# Patient Record
Sex: FEMALE | Race: WHITE | NOT HISPANIC OR LATINO | Employment: FULL TIME | URBAN - METROPOLITAN AREA
[De-identification: names, ages, dates, MRNs, and addresses within clinical notes are randomized per-mention and may not be internally consistent; named-entity substitution may affect disease eponyms.]

---

## 2022-08-18 ENCOUNTER — OFFICE VISIT (OUTPATIENT)
Dept: URGENT CARE | Facility: URGENT CARE | Age: 37
End: 2022-08-18
Payer: COMMERCIAL

## 2022-08-18 VITALS
RESPIRATION RATE: 16 BRPM | OXYGEN SATURATION: 96 % | SYSTOLIC BLOOD PRESSURE: 105 MMHG | DIASTOLIC BLOOD PRESSURE: 70 MMHG | HEART RATE: 85 BPM | TEMPERATURE: 99.9 F

## 2022-08-18 DIAGNOSIS — J02.9 ACUTE VIRAL PHARYNGITIS: Primary | ICD-10-CM

## 2022-08-18 LAB
DEPRECATED S PYO AG THROAT QL EIA: NEGATIVE
GROUP A STREP BY PCR: NOT DETECTED

## 2022-08-18 PROCEDURE — 99203 OFFICE O/P NEW LOW 30 MIN: CPT | Performed by: PHYSICIAN ASSISTANT

## 2022-08-18 PROCEDURE — 87651 STREP A DNA AMP PROBE: CPT | Performed by: PHYSICIAN ASSISTANT

## 2022-08-18 ASSESSMENT — PAIN SCALES - GENERAL: PAINLEVEL: EXTREME PAIN (8)

## 2022-08-18 ASSESSMENT — ENCOUNTER SYMPTOMS
SORE THROAT: 1
CONSTITUTIONAL NEGATIVE: 1
NAUSEA: 1
RESPIRATORY NEGATIVE: 1

## 2022-08-18 NOTE — PROGRESS NOTES
Assessment & Plan     Acute viral pharyngitis  - Streptococcus A Rapid Screen w/Reflex to PCR - Clinic Collect  - Group A Streptococcus PCR Throat Swab     Strep (-)  Increase fluids with water, Pedialyte, Gatorade, or rehydrating beverages. Alternate Tylenol and Ibuprofen as needed for aches, pains or fever. If needed, follow soft food diet. Rest as much as possible. Use OTC cough and cold medication. Run humidifier at night. Gargle with hot salty water. Have warm tea or water with honey. Follow up in clinic if symptoms persist or worsen. This usually can last 7-10 days.     Return if symptoms worsen or fail to improve, for Follow up.    Subjective     Willa is a 36 year old female who presents to clinic today for the following health issues:  Chief Complaint   Patient presents with     Urgent Care     Throat Pain     Per pt states she has been having throat pain for a couple of weeks states her two little ones have been sick on and off. States two co-workers tested positive for strep, states no fever but deep congestion , sore throat , and nausea. Pt has tried throat spray, ibuprofen and gargling      Willa presents with reports of sore throat x 2 weeks. She reports her children are sick and at home, they have gotten better. She reports congestion, sore throat, nausea. She has tried throat spray, ibuprofen and gargling. She had a cold, got better, then got worse again. Some days she starts to feel better.           Review of Systems   Constitutional: Negative.    HENT: Positive for congestion, postnasal drip and sore throat.    Respiratory: Negative.    Gastrointestinal: Positive for nausea.           Objective    /70   Pulse 85   Temp 99.9  F (37.7  C) (Skin)   Resp 16   LMP 08/15/2022   SpO2 96%   Physical Exam  Constitutional:       Appearance: Normal appearance.   HENT:      Head: Normocephalic and atraumatic.      Right Ear: Tympanic membrane, ear canal and external ear normal.      Left Ear:  Tympanic membrane, ear canal and external ear normal.      Nose: Congestion present.      Mouth/Throat:      Mouth: Mucous membranes are moist.      Pharynx: Oropharynx is clear. No oropharyngeal exudate or posterior oropharyngeal erythema.   Eyes:      Extraocular Movements: Extraocular movements intact.      Conjunctiva/sclera: Conjunctivae normal.      Pupils: Pupils are equal, round, and reactive to light.   Cardiovascular:      Rate and Rhythm: Normal rate and regular rhythm.      Heart sounds: Normal heart sounds.   Pulmonary:      Effort: Pulmonary effort is normal.      Breath sounds: Normal breath sounds.   Musculoskeletal:      Cervical back: Normal range of motion and neck supple.   Skin:     General: Skin is warm and dry.   Neurological:      Mental Status: She is alert.              Claude Zuniga PA-C

## 2022-08-18 NOTE — PATIENT INSTRUCTIONS
Strep (-)  Increase fluids with water, Pedialyte, Gatorade, or rehydrating beverages. Alternate Tylenol and Ibuprofen as needed for aches, pains or fever. If needed, follow soft food diet. Rest as much as possible. Use OTC cough and cold medication. Run humidifier at night. Gargle with hot salty water. Have warm tea or water with honey. Follow up in clinic if symptoms persist or worsen. This usually can last 7-10 days.

## 2022-12-19 ASSESSMENT — ENCOUNTER SYMPTOMS
HEARTBURN: 0
NERVOUS/ANXIOUS: 0
PARESTHESIAS: 0
COUGH: 1
PALPITATIONS: 0
FREQUENCY: 0
DIZZINESS: 0
SORE THROAT: 1
BREAST MASS: 0
HEADACHES: 1
FEVER: 0
HEMATOCHEZIA: 0
WEAKNESS: 0
DIARRHEA: 0
SHORTNESS OF BREATH: 0
NAUSEA: 0
JOINT SWELLING: 0
DYSURIA: 0
CONSTIPATION: 0
HEMATURIA: 0
ABDOMINAL PAIN: 1
EYE PAIN: 0
ARTHRALGIAS: 0
MYALGIAS: 0
CHILLS: 0

## 2022-12-19 NOTE — PATIENT INSTRUCTIONS
Good to see you today!  Schedule visit with me for Pap  PT referral placed.    Preventive Health Recommendations  Female Ages 26 - 39  Yearly exam:   See your health care provider every year in order to  Review health changes.   Discuss preventive care.    Review your medicines if you your doctor has prescribed any.    Until age 30: Get a Pap test every three years (more often if you have had an abnormal result).    After age 30: Talk to your doctor about whether you should have a Pap test every 3 years or have a Pap test with HPV screening every 5 years.   You do not need a Pap test if your uterus was removed (hysterectomy) and you have not had cancer.  You should be tested each year for STDs (sexually transmitted diseases), if you're at risk.   Talk to your provider about how often to have your cholesterol checked.  If you are at risk for diabetes, you should have a diabetes test (fasting glucose).  Shots: Get a flu shot each year. Get a tetanus shot every 10 years.   Nutrition:   Eat at least 5 servings of fruits and vegetables each day.  Eat whole-grain bread, whole-wheat pasta and brown rice instead of white grains and rice.  Get adequate Calcium and Vitamin D.     Lifestyle  Exercise at least 150 minutes a week (30 minutes a day, 5 days of the week). This will help you control your weight and prevent disease.  Limit alcohol to one drink per day.  No smoking.   Wear sunscreen to prevent skin cancer.  See your dentist every six months for an exam and cleaning.

## 2022-12-20 ENCOUNTER — OFFICE VISIT (OUTPATIENT)
Dept: FAMILY MEDICINE | Facility: CLINIC | Age: 37
End: 2022-12-20
Payer: COMMERCIAL

## 2022-12-20 VITALS
HEIGHT: 67 IN | TEMPERATURE: 97.3 F | WEIGHT: 112 LBS | SYSTOLIC BLOOD PRESSURE: 119 MMHG | DIASTOLIC BLOOD PRESSURE: 78 MMHG | BODY MASS INDEX: 17.58 KG/M2 | OXYGEN SATURATION: 98 % | HEART RATE: 91 BPM

## 2022-12-20 DIAGNOSIS — Z00.00 ROUTINE GENERAL MEDICAL EXAMINATION AT A HEALTH CARE FACILITY: Primary | ICD-10-CM

## 2022-12-20 DIAGNOSIS — N39.3 FEMALE STRESS INCONTINENCE: ICD-10-CM

## 2022-12-20 DIAGNOSIS — M62.08 DIASTASIS RECTI: ICD-10-CM

## 2022-12-20 DIAGNOSIS — I47.10 SVT (SUPRAVENTRICULAR TACHYCARDIA) (H): ICD-10-CM

## 2022-12-20 DIAGNOSIS — Z97.5 IUD (INTRAUTERINE DEVICE) IN PLACE: ICD-10-CM

## 2022-12-20 PROBLEM — I73.00 RAYNAUD'S PHENOMENON: Status: ACTIVE | Noted: 2021-01-28

## 2022-12-20 PROBLEM — K21.9 GERD (GASTROESOPHAGEAL REFLUX DISEASE): Status: ACTIVE | Noted: 2017-09-28

## 2022-12-20 NOTE — NURSING NOTE
"37 year old  Chief Complaint   Patient presents with     Physical     Pt reports she is on Day 14 of being sick, has a really bad cough. Incontinence and pelvic pain.        Blood pressure 119/78, pulse 91, temperature 97.3  F (36.3  C), height 1.71 m (5' 7.32\"), weight 50.8 kg (112 lb), SpO2 98 %, currently breastfeeding. Body mass index is 17.37 kg/m .  There is no problem list on file for this patient.      Wt Readings from Last 2 Encounters:   12/20/22 50.8 kg (112 lb)     BP Readings from Last 3 Encounters:   12/20/22 119/78   08/18/22 105/70         Current Outpatient Medications   Medication     levonorgestrel (KYLEENA) 19.5 MG IUD     Prenatal Vit-Fe Fumarate-FA (PRENATAL VITAMIN PO)     VITAMIN D PO     No current facility-administered medications for this visit.       Social History     Tobacco Use     Smoking status: Never     Smokeless tobacco: Never   Substance Use Topics     Alcohol use: Yes     Comment: 2 drinks week     Drug use: Never       Health Maintenance Due   Topic Date Due     ADVANCE CARE PLANNING  Never done     PAP  Never done       No results found for: PAP      December 20, 2022 8:46 AM  "

## 2022-12-20 NOTE — PROGRESS NOTES
CC: Physical (Pt reports she is on Day 14 of being sick, has a really bad cough. Incontinence and pelvic pain. )      Lizeth is a 37 year old female who presents to clinic for an annual exam.       New concerns:  Sick x 14 days. Has a lingering cough but otherwise slowly starting to feel better. 3-5 days of fever, has not had recurrent fever since that time.    Has been having increase incontinence with coughing  Small amounts of leaking  Did pelvic floor PT, reached maximum number of visits  [facility] PT  [place] it was helpful for this and distasis recti     Was having pain with intercourse, worse on R side about 2 weeks ago  Felt similar to ovarian cyst pain in the past  Has since resolved and not been an ongoing issue    History of SVT -   Gets more dizziness with illness  Pushes fluids, caution with getting up   No other SVT symptoms outside of illness    Chronic health conditions:  Patient Active Problem List   Diagnosis     Basal cell carcinoma of scalp and skin of neck     GERD (gastroesophageal reflux disease)     Orthostatic dizziness     Palpitations     Raynaud's phenomenon     SVT (supraventricular tachycardia) (H)     IUD (intrauterine device) in place       We reviewed and updated her medication list. Her past medical and surgical history as well as her family history were reviewed and updated as necessary.    Gynecological history:  Menstrual/PMS/menopausal symptoms: very light with IUD  Last Pap:  2017, result Normal  History of abnormal Paps: no  Concern for STIs: no  Safety: Feels safe in relationship  Would you like to become pregnant in the next year? maybe Contraceptive method: Kyleena IUD  OB history:   Breast cancer screening: n/a    Other health-related habits:  Nutrition: varied and balanced  Physical activity: 2-3 days/week  Tobacco: None    Alcohol: Occassional  Drug use: no   Mood: normal  Dental: Up to date  Vaccines: UTD  Hep C & HIV screening: UTD  DM  "screening: negative OGT in pregnancy 1 year ago  Lipids: will defer screening until weaned from breastfeeding  Colon cancer screening: n/a      OBJECTIVE:   /78   Pulse 91   Temp 97.3  F (36.3  C)   Ht 1.71 m (5' 7.32\")   Wt 50.8 kg (112 lb)   SpO2 98%   Breastfeeding Yes   BMI 17.37 kg/m     General: Healthy female in NAD.  Cooperative and pleasant.  HEENT: Normocephalic, atraumatic. Oropharynx moist without lesions or exudate.  TMs normal. Supple neck, without lymphadenopathy or thyromegaly.    Lungs: CTA bilaterally.  CV: RRR, normal S1 and S2, without murmurs, rubs, or gallops appreciated.    Abdomen: Soft, NT, ND.  No masses or hepatosplenomegaly appreciated.    Gyn: deferred today, will return at later date  Extremities: WWW, without deformity, edema.  Skin: Clear without lesions or rashes.   Neuro: Grossly intact, nonfocal.  Psych: Mood and affect appropriate.      ASSESSMENT AND PLAN:   Willa was seen today for physical.    Diagnoses and all orders for this visit:    Routine general medical examination at a health care facility    IUD (intrauterine device) in place    SVT (supraventricular tachycardia) (H)    Female stress incontinence  -     Physical Therapy Referral; Future    Diastasis recti  -     Physical Therapy Referral; Future      Will follow-up for Pap  No evidence of PNA on exam today, discussed post-viral cough  Return to PT if symptoms do not resolve following URI    Follow-up: 1 year, sooner KELSEA Maguire MD/[facility]  Family Medicine   "

## 2022-12-22 ENCOUNTER — OFFICE VISIT (OUTPATIENT)
Dept: FAMILY MEDICINE | Facility: CLINIC | Age: 37
End: 2022-12-22
Payer: COMMERCIAL

## 2022-12-22 VITALS
HEART RATE: 84 BPM | SYSTOLIC BLOOD PRESSURE: 114 MMHG | OXYGEN SATURATION: 100 % | WEIGHT: 112 LBS | TEMPERATURE: 97.5 F | DIASTOLIC BLOOD PRESSURE: 72 MMHG | HEIGHT: 67 IN | BODY MASS INDEX: 17.58 KG/M2

## 2022-12-22 DIAGNOSIS — Z12.4 SCREENING FOR CERVICAL CANCER: Primary | ICD-10-CM

## 2022-12-22 PROCEDURE — 87624 HPV HI-RISK TYP POOLED RSLT: CPT | Performed by: FAMILY MEDICINE

## 2022-12-22 PROCEDURE — G0145 SCR C/V CYTO,THINLAYER,RESCR: HCPCS | Performed by: FAMILY MEDICINE

## 2022-12-22 NOTE — PROGRESS NOTES
"CC: Gyn Exam (Pap and breast exam today)        ASSESSMENT/PLAN:   Willa was seen today for gyn exam.    Diagnoses and all orders for this visit:    Screening for cervical cancer  -     Pap imaged thin layer screen with HPV - recommended age 30 - 65 years; Future        Worrisome signs and symptoms were discussed with Willa and she was instructed to return to the clinic for concerning symptoms or to call with questions. All patient questions answered.    Follow up: 1 year, sooner KELSEA Maguire MD/[facility]  Family Medicine      SUBJECTIVE: Lizeth is a 37 year old female who comes in with the following concerns:    Breast and pelvic exam      OBJECTIVE:   /72   Pulse 84   Temp 97.5  F (36.4  C)   Ht 1.71 m (5' 7.32\")   Wt 50.8 kg (112 lb)   LMP 12/15/2022 (Approximate)   SpO2 100%   BMI 17.37 kg/m    General: Alert and oriented in no acute distress.  Skin: Clear without lesions or rashes.   Lymph: No anterior cervical lymphadenopathy.   Eyes: PERRL. EOMI.   ENT: TMs intact and pearly gray. Oropharynx moist without lesions or exudate. Supple neck.  Neuro: Grossly intact, nonfocal.  Breast exam: no axillary LAD. No palpable masses or skin changes present  Cardio: RRR, normal S1 and S2, without murmurs, rubs, or gallops appreciated.    Resp: CTA bilaterally. Normal respiratory effort.   GI: Soft, NT, ND.  No masses or hepatosplenomegaly appreciated. 1-2 finger DR present with possible small ventral hernia 2cm caudal to umbilicus  : normal external genitalia. Normal vaginal mucosa, moderate white discharge. Multiparous cervix without lesions present, IUD strings visualized extending 2cm from cervical os. Pap collected.  MSK: Distal pulses 2+ and symmetric, extremities without deformity, edema.  Psych: Mood and affect appropriate.     "

## 2022-12-26 LAB
BKR LAB AP GYN ADEQUACY: NORMAL
BKR LAB AP GYN INTERPRETATION: NORMAL
BKR LAB AP HPV REFLEX: NORMAL
BKR LAB AP LMP: NORMAL
BKR LAB AP PREVIOUS ABNORMAL: NORMAL
PATH REPORT.COMMENTS IMP SPEC: NORMAL
PATH REPORT.COMMENTS IMP SPEC: NORMAL
PATH REPORT.RELEVANT HX SPEC: NORMAL

## 2022-12-28 LAB
HUMAN PAPILLOMA VIRUS 16 DNA: NEGATIVE
HUMAN PAPILLOMA VIRUS 18 DNA: NEGATIVE
HUMAN PAPILLOMA VIRUS FINAL DIAGNOSIS: NORMAL
HUMAN PAPILLOMA VIRUS OTHER HR: NEGATIVE

## 2023-01-17 ENCOUNTER — OFFICE VISIT (OUTPATIENT)
Dept: FAMILY MEDICINE | Facility: CLINIC | Age: 38
End: 2023-01-17
Payer: COMMERCIAL

## 2023-01-17 VITALS
WEIGHT: 110 LBS | RESPIRATION RATE: 16 BRPM | HEIGHT: 66 IN | DIASTOLIC BLOOD PRESSURE: 68 MMHG | BODY MASS INDEX: 17.68 KG/M2 | HEART RATE: 85 BPM | SYSTOLIC BLOOD PRESSURE: 102 MMHG | OXYGEN SATURATION: 99 % | TEMPERATURE: 98 F

## 2023-01-17 DIAGNOSIS — J06.9 UPPER RESPIRATORY TRACT INFECTION, UNSPECIFIED TYPE: Primary | ICD-10-CM

## 2023-01-17 DIAGNOSIS — J02.0 STREP THROAT: ICD-10-CM

## 2023-01-17 LAB
DEPRECATED S PYO AG THROAT QL EIA: POSITIVE
FLUAV RNA SPEC QL NAA+PROBE: NEGATIVE
FLUBV RNA RESP QL NAA+PROBE: NEGATIVE
RSV RNA SPEC NAA+PROBE: NEGATIVE
SARS-COV-2 RNA RESP QL NAA+PROBE: NEGATIVE

## 2023-01-17 PROCEDURE — 87637 SARSCOV2&INF A&B&RSV AMP PRB: CPT | Performed by: FAMILY MEDICINE

## 2023-01-17 RX ORDER — AMOXICILLIN 875 MG
875 TABLET ORAL 2 TIMES DAILY
Qty: 14 TABLET | Refills: 0 | Status: SHIPPED | OUTPATIENT
Start: 2023-01-17 | End: 2023-10-31

## 2023-01-17 NOTE — NURSING NOTE
"37 year old  Chief Complaint   Patient presents with     Pharyngitis     Sore throat ongoing 2-3 days, body aches. Experienced chills yesterday, not today.       Blood pressure 102/68, pulse 85, temperature 98  F (36.7  C), temperature source Skin, resp. rate 16, height 1.676 m (5' 6\"), weight 49.9 kg (110 lb), last menstrual period 12/15/2022, SpO2 99 %, currently breastfeeding. Body mass index is 17.75 kg/m .  Patient Active Problem List   Diagnosis     Basal cell carcinoma of scalp and skin of neck     GERD (gastroesophageal reflux disease)     Orthostatic dizziness     Palpitations     Raynaud's phenomenon     SVT (supraventricular tachycardia) (H)     IUD (intrauterine device) in place       Wt Readings from Last 2 Encounters:   01/17/23 49.9 kg (110 lb)   12/22/22 50.8 kg (112 lb)     BP Readings from Last 3 Encounters:   01/17/23 102/68   12/22/22 114/72   12/20/22 119/78         Current Outpatient Medications   Medication     levonorgestrel (KYLEENA) 19.5 MG IUD     Prenatal Vit-Fe Fumarate-FA (PRENATAL VITAMIN PO)     VITAMIN D PO     No current facility-administered medications for this visit.       Social History     Tobacco Use     Smoking status: Never     Smokeless tobacco: Never   Vaping Use     Vaping Use: Never used   Substance Use Topics     Alcohol use: Yes     Comment: 2 drinks week     Drug use: Never       Health Maintenance Due   Topic Date Due     ADVANCE CARE PLANNING  Never done     Pneumococcal Vaccine: Pediatrics (0 to 5 Years) and At-Risk Patients (6 to 64 Years) (1 - PCV) Never done       No results found for: PAP      January 17, 2023 9:49 AM    "

## 2023-01-17 NOTE — PROGRESS NOTES
"  Assessment & Plan   Problem List Items Addressed This Visit    None  Visit Diagnoses     Upper respiratory tract infection, unspecified type    -  Primary    Relevant Orders    Symptomatic Influenza A/B & SARS-CoV2 (COVID-19) Virus PCR Multiplex    Streptococcus A Rapid Screen w/Reflex to PCR - Clinic Collect    Strep throat        Relevant Medications    amoxicillin (AMOXIL) 875 MG tablet         Rapid strep positive. Notified patient of results and ABX sent as noted above.     Will follow up further labs as indicated.     22 minutes spent on the date of the encounter doing chart review, history and exam, documentation and further activities as noted.    Sang Jimenez MD  [facility]    Lenny Stanley is a 37 year old presenting for the following health issues:  No chief complaint on file.      HPI   Sore throat, swollen glands  - general body aches and fatigue  - negative COVID test  - kids in  bring everything home  - recently the family has gone through RSF, flu and COVID, so far not strep    Review of Systems   Constitutional, HEENT, cardiovascular, pulmonary, gi and gu systems are negative, except as otherwise noted.      Objective    /68 (BP Location: Right arm, Patient Position: Sitting, Cuff Size: Adult Regular)   Pulse 85   Temp 98  F (36.7  C) (Skin)   Resp 16   Ht 1.676 m (5' 6\")   Wt 49.9 kg (110 lb)   LMP 12/15/2022 (Approximate)   SpO2 99%   Breastfeeding Yes   BMI 17.75 kg/m    Body mass index is 17.75 kg/m .  Physical Exam   GENERAL: healthy, alert and no distress  HENT: posterior oropharyngeal exudate and erythema; ear canals and TM's normal, nose and mouth without ulcers or lesions  NECK: bilateral shoddy lymphadenopathy, no asymmetry, masses, or scars and thyroid normal to palpation  RESP: lungs clear to auscultation - no rales, rhonchi or wheezes  CV: regular rate and rhythm, normal S1 S2, no S3 or S4, no murmur, click or rub, no peripheral edema and peripheral " pulses strong  MS: no gross musculoskeletal defects noted, no edema

## 2023-04-20 ENCOUNTER — OFFICE VISIT (OUTPATIENT)
Dept: FAMILY MEDICINE | Facility: CLINIC | Age: 38
End: 2023-04-20
Payer: COMMERCIAL

## 2023-04-20 VITALS
DIASTOLIC BLOOD PRESSURE: 74 MMHG | WEIGHT: 110 LBS | BODY MASS INDEX: 17.68 KG/M2 | RESPIRATION RATE: 15 BRPM | HEART RATE: 81 BPM | OXYGEN SATURATION: 99 % | HEIGHT: 66 IN | TEMPERATURE: 97.6 F | SYSTOLIC BLOOD PRESSURE: 118 MMHG

## 2023-04-20 DIAGNOSIS — J02.0 STREP THROAT: ICD-10-CM

## 2023-04-20 DIAGNOSIS — J02.9 SORE THROAT: Primary | ICD-10-CM

## 2023-04-20 PROBLEM — S92.109A TALAR FRACTURE: Status: ACTIVE | Noted: 2017-09-28

## 2023-04-20 LAB — DEPRECATED S PYO AG THROAT QL EIA: POSITIVE

## 2023-04-20 RX ORDER — CLINDAMYCIN HCL 300 MG
300 CAPSULE ORAL 3 TIMES DAILY
Qty: 30 CAPSULE | Refills: 0 | Status: SHIPPED | OUTPATIENT
Start: 2023-04-20 | End: 2023-10-31

## 2023-04-20 NOTE — NURSING NOTE
"37 year old  Chief Complaint   Patient presents with     Pharyngitis     Ongoing since Monday, pharyngitis, congestion in the throat, fatigue.        Blood pressure 118/74, pulse 81, temperature 97.6  F (36.4  C), temperature source Skin, resp. rate 15, height 1.676 m (5' 6\"), weight 49.9 kg (110 lb), SpO2 99 %, currently breastfeeding. Body mass index is 17.75 kg/m .  Patient Active Problem List   Diagnosis     Basal cell carcinoma of scalp and skin of neck     GERD (gastroesophageal reflux disease)     Orthostatic dizziness     Palpitations     Raynaud's phenomenon     SVT (supraventricular tachycardia) (H)     IUD (intrauterine device) in place       Wt Readings from Last 2 Encounters:   04/20/23 49.9 kg (110 lb)   01/17/23 49.9 kg (110 lb)     BP Readings from Last 3 Encounters:   04/20/23 118/74   01/17/23 102/68   12/22/22 114/72         Current Outpatient Medications   Medication     levonorgestrel (KYLEENA) 19.5 MG IUD     Prenatal Vit-Fe Fumarate-FA (PRENATAL VITAMIN PO)     VITAMIN D PO     amoxicillin (AMOXIL) 875 MG tablet     No current facility-administered medications for this visit.       Social History     Tobacco Use     Smoking status: Never     Smokeless tobacco: Never   Vaping Use     Vaping status: Never Used   Substance Use Topics     Alcohol use: Yes     Comment: 2 drinks week     Drug use: Never       Health Maintenance Due   Topic Date Due     ADVANCE CARE PLANNING  Never done     Pneumococcal Vaccine: Pediatrics (0 to 5 Years) and At-Risk Patients (6 to 64 Years) (1 - PCV) Never done       No results found for: PAP      April 20, 2023 10:47 AM    "

## 2023-04-20 NOTE — PROGRESS NOTES
"  Assessment & Plan   Problem List Items Addressed This Visit    None  Visit Diagnoses     Sore throat    -  Primary    Relevant Orders    Streptococcus A Rapid Screen w/Reflex to PCR (Completed)    Strep throat        Relevant Medications    clindamycin (CLEOCIN) 300 MG capsule         Positive strep test as noted below. Will treat with Clindamycin given listed allergy to amoxicillin. Advised patient that diarrhea and upset stomach can be seen with ABX use and does not necessarily indicate an allergic response.     25 minutes spent on the date of the encounter doing chart review, history and exam, documentation and further activities as noted.    Sang Jimenez MD  [facility]    Subjective   Lizeth is a 37 year old, presenting for the following health issues:  Pharyngitis (Ongoing since Monday, pharyngitis, congestion in the throat, fatigue. )    HPI   Sore Throat  - for the past 4 days  - daughter was sick just prior, but her only real symptom was fatigue  - family has been through \"all the usual\" respiratory infections this season  - Lizeth denies significant head congestion, drainage, cough, headache  - but the sore throat has been unbearable over the past few days  - listed allergy to amoxicillin with listed side effect of diarrhea      Review of Systems   Constitutional, HEENT, cardiovascular, pulmonary, gi and gu systems are negative, except as otherwise noted.      Objective    /74 (BP Location: Right arm, Patient Position: Sitting, Cuff Size: Adult Regular)   Pulse 81   Temp 97.6  F (36.4  C) (Skin)   Resp 15   Ht 1.676 m (5' 6\")   Wt 49.9 kg (110 lb)   SpO2 99%   BMI 17.75 kg/m    Body mass index is 17.75 kg/m .  Physical Exam   GENERAL: healthy, alert and no distress  THROAT: red, swollen tonsils with visible exudate  NECK: bilateral lymphadenopathy, no asymmetry, masses, or scars and thyroid normal to palpation  RESP: lungs clear to auscultation - no rales, rhonchi or wheezes  CV: " regular rate and rhythm, normal S1 S2, no S3 or S4, no murmur, click or rub, no peripheral edema and peripheral pulses strong  ABDOMEN: soft, nontender, no hepatosplenomegaly, no masses and bowel sounds normal  MS: no gross musculoskeletal defects noted, no edema    Recent Results (from the past 24 hour(s))   Streptococcus A Rapid Screen w/Reflex to PCR    Collection Time: 04/20/23 11:02 AM    Specimen: Throat; Swab   Result Value Ref Range    Group A Strep antigen Positive (A) Negative

## 2023-10-30 ASSESSMENT — ENCOUNTER SYMPTOMS
HEMATOCHEZIA: 0
HEARTBURN: 0
CHILLS: 0
NERVOUS/ANXIOUS: 0
MYALGIAS: 0
JOINT SWELLING: 0
NAUSEA: 0
EYE PAIN: 1
PARESTHESIAS: 0
DYSURIA: 0
DIZZINESS: 0
HEMATURIA: 0
FEVER: 0
SORE THROAT: 0
CONSTIPATION: 0
COUGH: 0
SHORTNESS OF BREATH: 0
FREQUENCY: 0
HEADACHES: 0
DIARRHEA: 0
WEAKNESS: 0
BREAST MASS: 0
ABDOMINAL PAIN: 0
PALPITATIONS: 0
ARTHRALGIAS: 0

## 2023-10-31 ENCOUNTER — OFFICE VISIT (OUTPATIENT)
Dept: FAMILY MEDICINE | Facility: CLINIC | Age: 38
End: 2023-10-31
Payer: COMMERCIAL

## 2023-10-31 VITALS
DIASTOLIC BLOOD PRESSURE: 82 MMHG | HEART RATE: 91 BPM | TEMPERATURE: 98.5 F | HEIGHT: 66 IN | SYSTOLIC BLOOD PRESSURE: 116 MMHG | WEIGHT: 111 LBS | OXYGEN SATURATION: 99 % | BODY MASS INDEX: 17.84 KG/M2

## 2023-10-31 DIAGNOSIS — Z00.00 ROUTINE GENERAL MEDICAL EXAMINATION AT A HEALTH CARE FACILITY: Primary | ICD-10-CM

## 2023-10-31 DIAGNOSIS — K42.9 UMBILICAL HERNIA WITHOUT OBSTRUCTION AND WITHOUT GANGRENE: ICD-10-CM

## 2023-10-31 DIAGNOSIS — Z85.828 HISTORY OF BASAL CELL CARCINOMA: ICD-10-CM

## 2023-10-31 DIAGNOSIS — I47.10 SVT (SUPRAVENTRICULAR TACHYCARDIA) (H): ICD-10-CM

## 2023-10-31 DIAGNOSIS — Z23 NEED FOR VACCINATION: ICD-10-CM

## 2023-10-31 LAB
ERYTHROCYTE [DISTWIDTH] IN BLOOD BY AUTOMATED COUNT: 12.8 % (ref 10–15)
FERRITIN SERPL-MCNC: 142 NG/ML (ref 6–175)
HCT VFR BLD AUTO: 39.2 % (ref 35–47)
HGB BLD-MCNC: 12.7 G/DL (ref 11.7–15.7)
MCH RBC QN AUTO: 30.1 PG (ref 26.5–33)
MCHC RBC AUTO-ENTMCNC: 32.4 G/DL (ref 31.5–36.5)
MCV RBC AUTO: 93 FL (ref 78–100)
PLATELET # BLD AUTO: 199 10E3/UL (ref 150–450)
RBC # BLD AUTO: 4.22 10E6/UL (ref 3.8–5.2)
TSH SERPL DL<=0.005 MIU/L-ACNC: 1.12 UIU/ML (ref 0.3–4.2)
WBC # BLD AUTO: 4.6 10E3/UL (ref 4–11)

## 2023-10-31 PROCEDURE — 84443 ASSAY THYROID STIM HORMONE: CPT | Mod: ORL | Performed by: FAMILY MEDICINE

## 2023-10-31 PROCEDURE — 82728 ASSAY OF FERRITIN: CPT | Mod: ORL | Performed by: FAMILY MEDICINE

## 2023-10-31 RX ORDER — METOPROLOL TARTRATE 50 MG
25-50 TABLET ORAL 2 TIMES DAILY PRN
Qty: 30 TABLET | Refills: 1 | Status: SHIPPED | OUTPATIENT
Start: 2023-10-31

## 2023-10-31 NOTE — NURSING NOTE
"Injectable Influenza Immunization Documentation    1.  Has the patient received the information for the injectable influenza vaccine? YES     2. Is the patient 6 months of age or older? YES     3. Does the patient have any of the following contraindications?         Severe allergy to eggs? No     Severe allergic reaction to previous influenza vaccines? No   Severe allergy to latex? No       History of Guillain-Embarrass syndrome? No     Currently have a temperature greater than 100.4F? No        4.  Severely egg allergic patients should have flu vaccine eligibility assessed by an MD, RN, or pharmacist, and those who received flu vaccine should be observed for 15 min by an MD, RN, Pharmacist, Medical Technician, or member of clinic staff.\": YES    5. Latex-allergic patients should be given latex-free influenza vaccine Yes. Please reference the Vaccine latex table to determine if your clinic s product is latex-containing.       Vaccination given by Sunitha Hood CMA on 10/31/2023 at 10:03 AM       "

## 2023-10-31 NOTE — PROGRESS NOTES
CC: Physical (No concerns, umbilical hernia check in on. )      Lizeth is a 38 year old female who presents to clinic for an annual exam.       New concerns:  Umbilical hernia - had a lot of pain when running a trail race this summer, otherwise has not had pain. Wondering if it's worth considering repair, although she and her  are still debating another pregnancy.    GERD - doing well. Has been improved since having children    Having more palpitations from SVT recently. Able to control with sitting down, elevating feet or Valsalva. Feels like it has been more noticeable over the past 6 months, coincidentally she stopped taking prenatal vitamin around this time    Due for dermatology visit for history of BCC of head/neck    Chronic health conditions:  Patient Active Problem List   Diagnosis    Basal cell carcinoma of scalp and skin of neck    GERD (gastroesophageal reflux disease)    Orthostatic dizziness    Palpitations    Raynaud's phenomenon    SVT (supraventricular tachycardia)    IUD (intrauterine device) in place    Myopia    Talar fracture     We reviewed and updated her medication list. Her past medical and surgical history as well as her family history were reviewed and updated as necessary.    Gynecological history:  Menstrual/PMS/menopausal symptoms: light  Last Pap:  2022, result Normal  History of abnormal Paps: no  Concern for STIs: no  Safety: Feels safe in relationship  Would you like to become pregnant in the next year? maybe Contraceptive method: Kyleena IUD  OB history:   Breast cancer screening: n/a    Other health-related habits:  Nutrition: varied and balanced  Physical activity: less with children  Tobacco: None    Alcohol: Occassional  Drug use: no   Mood: normal  Dental: Up to date  Vaccines: Flu today, deferring COVID due to recent infection  Hep C & HIV screening: previously completed  DM screening: n/a  Lipids: n/a  Colon cancer screening: n/a      OBJECTIVE:   /82  "(BP Location: Left arm, Patient Position: Sitting, Cuff Size: Adult Regular)   Pulse 91   Temp 98.5  F (36.9  C) (Skin)   Ht 1.676 m (5' 6\")   Wt 50.3 kg (111 lb)   LMP 10/11/2023 (Exact Date)   SpO2 99%   BMI 17.92 kg/m     General: Healthy female in NAD.  Cooperative and pleasant.  HEENT: Normocephalic, atraumatic. Oropharynx moist without lesions or exudate.  TMs normal. Supple neck, without lymphadenopathy or thyromegaly.    Lungs: CTA bilaterally.  CV: RRR, normal S1 and S2, without murmurs, rubs, or gallops appreciated.    Abdomen: Soft, NT, ND.  No masses or hepatosplenomegaly appreciated.  Umbilical hernia with approximately 0.5 cm deficit present. Moderate diastasis rectus  Extremities: WWW, without deformity, edema.  Skin: Clear without lesions or rashes.   Neuro: Grossly intact, nonfocal.  Psych: Mood and affect appropriate.      ASSESSMENT AND PLAN:   Lizeth was seen today for physical.    Diagnoses and all orders for this visit:    Routine general medical examination at a health care facility    SVT (supraventricular tachycardia)  -     CBC with platelets; Future  -     Ferritin; Future  -     TSH with free T4 reflex; Future  -     metoprolol tartrate (LOPRESSOR) 50 MG tablet; Take 0.5-1 tablets (25-50 mg) by mouth 2 times daily as needed (palpitations)  -     CBC with platelets  -     Ferritin  -     TSH with free T4 reflex    Check labs given increase in symptom frequency. Metoprolol PRN for use.    Need for vaccination  -     INFLUENZA VACCINE IM > 6 MONTHS VALENT IIV4 (FLUZONE)    History of basal cell carcinoma    -     Adult Dermatology  Referral; Future    Umbilical hernia without obstruction and without gangrene  -     Adult General Surg Referral    Referral for consult re: umbilical hernia given pain with exercise. Also contemplating another pregnancy so may wait until childbearing complete      Follow-up: 1 year, sooner PRN (IUD removal if desired)    Bernice Maguire, " MD/[facility]  Family Medicine

## 2023-10-31 NOTE — PATIENT INSTRUCTIONS
Nice to see you today!    We will draw lab tests today. I will contact you in the next 2-3 business days with the results of these labs.     [facility]  [email or web address]    Try metoprolol 1/2 - 1 tablet twice daily as needed for palpitation    [facility] [place]  [address] [place] [address]  [phone]    Lab/X-ray hours:  M-F 7:00AM - 7:00PM  Sat 9:00AM - 1:00PM  Sun - Closed

## 2023-10-31 NOTE — NURSING NOTE
"38 year old  Chief Complaint   Patient presents with    Physical     No concerns, umbilical hernia check in on.        Blood pressure 116/82, pulse 91, temperature 98.5  F (36.9  C), temperature source Skin, height 1.676 m (5' 6\"), weight 50.3 kg (111 lb), last menstrual period 10/11/2023, SpO2 99%, currently breastfeeding. Body mass index is 17.92 kg/m .  Patient Active Problem List   Diagnosis    Basal cell carcinoma of scalp and skin of neck    GERD (gastroesophageal reflux disease)    Orthostatic dizziness    Palpitations    Raynaud's phenomenon    SVT (supraventricular tachycardia)    IUD (intrauterine device) in place    Myopia    Talar fracture       Wt Readings from Last 2 Encounters:   10/31/23 50.3 kg (111 lb)   04/20/23 49.9 kg (110 lb)     BP Readings from Last 3 Encounters:   10/31/23 116/82   04/20/23 118/74   01/17/23 102/68         Current Outpatient Medications   Medication    levonorgestrel (KYLEENA) 19.5 MG IUD    amoxicillin (AMOXIL) 875 MG tablet    clindamycin (CLEOCIN) 300 MG capsule    Prenatal Vit-Fe Fumarate-FA (PRENATAL VITAMIN PO)    VITAMIN D PO     No current facility-administered medications for this visit.       Social History     Tobacco Use    Smoking status: Never    Smokeless tobacco: Never   Vaping Use    Vaping Use: Never used   Substance Use Topics    Alcohol use: Yes     Comment: 2 drinks week    Drug use: Never       Health Maintenance Due   Topic Date Due    ADVANCE CARE PLANNING  Never done    Pneumococcal Vaccine: Pediatrics (0 to 5 Years) and At-Risk Patients (6 to 64 Years) (1 - PCV) Never done    INFLUENZA VACCINE (1) 09/01/2023    COVID-19 Vaccine (5 - 2023-24 season) 09/01/2023       No results found for: \"PAP\"      October 31, 2023 9:18 AM   "

## 2023-11-16 PROCEDURE — 88305 TISSUE EXAM BY PATHOLOGIST: CPT | Mod: 26 | Performed by: DERMATOLOGY

## 2023-11-16 PROCEDURE — 88305 TISSUE EXAM BY PATHOLOGIST: CPT | Mod: TC,ORL | Performed by: STUDENT IN AN ORGANIZED HEALTH CARE EDUCATION/TRAINING PROGRAM

## 2023-11-17 ENCOUNTER — LAB REQUISITION (OUTPATIENT)
Dept: LAB | Facility: CLINIC | Age: 38
End: 2023-11-17
Payer: COMMERCIAL

## 2023-11-17 DIAGNOSIS — D48.5 NEOPLASM OF UNCERTAIN BEHAVIOR OF SKIN: ICD-10-CM

## 2023-11-21 LAB
PATH REPORT.COMMENTS IMP SPEC: NORMAL
PATH REPORT.COMMENTS IMP SPEC: NORMAL
PATH REPORT.FINAL DX SPEC: NORMAL
PATH REPORT.GROSS SPEC: NORMAL
PATH REPORT.MICROSCOPIC SPEC OTHER STN: NORMAL
PATH REPORT.RELEVANT HX SPEC: NORMAL

## 2024-01-10 ENCOUNTER — OFFICE VISIT (OUTPATIENT)
Dept: FAMILY MEDICINE | Facility: CLINIC | Age: 39
End: 2024-01-10
Payer: COMMERCIAL

## 2024-01-10 VITALS
WEIGHT: 113 LBS | DIASTOLIC BLOOD PRESSURE: 75 MMHG | OXYGEN SATURATION: 100 % | BODY MASS INDEX: 18.16 KG/M2 | TEMPERATURE: 98.2 F | SYSTOLIC BLOOD PRESSURE: 114 MMHG | HEART RATE: 71 BPM | HEIGHT: 66 IN

## 2024-01-10 DIAGNOSIS — R10.32 LLQ ABDOMINAL PAIN: Primary | ICD-10-CM

## 2024-01-10 NOTE — PROGRESS NOTES
"CC: Pelvic Pain (Noticed it about 2 days ago with some lower back pain. Worse on the left side wondering about ovarian cyst)      SUBJECTIVE: Lizeth is a 38 year old female who comes in with the following concerns:    A couple days ago was having a lot of lower back pain, iced and stretched. Really bad the next morning and felt a little bloated and painful in the LLQ.  Also a radiating pain on the left side when wiping, felt the pressure on her perineum.  This morning had a decent amt of pain so called to schedule, but has been feeling a lot better the rest of the day.   Kyleena IUD, checks for strings occasionally. Once a month will have a spotting day. Very light.      No fevers, chills.   No abnormal vaginal discharge or bleeding. No STI concerns.   No dysuria, frequency, urgency.   Normal bowel movements. Daily soft and easy to pass.     Has had ovarian cysts in the past. Usually small and go away on her own.     Has tried ice & ibuprofen w/ some relief.       OBJECTIVE:   /75   Pulse 71   Temp 98.2  F (36.8  C)   Ht 1.676 m (5' 5.98\")   Wt 51.3 kg (113 lb)   LMP 12/28/2023 (Exact Date)   SpO2 100%   BMI 18.25 kg/m    General: Alert and oriented in no acute distress.  GI: Soft, very mildly tender in LLQ with deep palpation, otherwise non-tender, ND. No rebound tenderness or guarding. No masses or hepatosplenomegaly appreciated.        ASSESSMENT/PLAN:   Lizeth was seen today for pelvic pain.    Diagnoses and all orders for this visit:    LLQ abdominal pain    Left lower quadrant abdominal pain, present over the last 2-3 days but improving throughout the course of the day today. Pain is mild on exam today. Discussed ddx - ovarian cyst, diverticulitis, msk strain. She feels comfortable monitoring sx for now and if sx worsen this evening or tomorrow morning she will call. Call tomorrow if sx worsening/not improving and I will place order for pelvic US to evaluate for ovarian cyst or other uterine/ovarian " pathology. Worrisome signs and symptoms were discussed with Lizeth and she was instructed to return to the clinic for concerning symptoms or to call with questions.  Return if symptoms worsen or fail to improve.    Mercy Quevedo MD  Family Medicine

## 2024-12-15 ENCOUNTER — HEALTH MAINTENANCE LETTER (OUTPATIENT)
Age: 39
End: 2024-12-15

## 2024-12-17 SDOH — HEALTH STABILITY: PHYSICAL HEALTH: ON AVERAGE, HOW MANY MINUTES DO YOU ENGAGE IN EXERCISE AT THIS LEVEL?: 20 MIN

## 2024-12-17 SDOH — HEALTH STABILITY: PHYSICAL HEALTH: ON AVERAGE, HOW MANY DAYS PER WEEK DO YOU ENGAGE IN MODERATE TO STRENUOUS EXERCISE (LIKE A BRISK WALK)?: 4 DAYS

## 2024-12-17 ASSESSMENT — SOCIAL DETERMINANTS OF HEALTH (SDOH): HOW OFTEN DO YOU GET TOGETHER WITH FRIENDS OR RELATIVES?: TWICE A WEEK

## 2024-12-19 ENCOUNTER — OFFICE VISIT (OUTPATIENT)
Dept: FAMILY MEDICINE | Facility: CLINIC | Age: 39
End: 2024-12-19
Payer: COMMERCIAL

## 2024-12-19 VITALS
OXYGEN SATURATION: 97 % | HEIGHT: 67 IN | RESPIRATION RATE: 16 BRPM | SYSTOLIC BLOOD PRESSURE: 112 MMHG | DIASTOLIC BLOOD PRESSURE: 78 MMHG | TEMPERATURE: 98.3 F | BODY MASS INDEX: 20.25 KG/M2 | WEIGHT: 129 LBS | HEART RATE: 75 BPM

## 2024-12-19 DIAGNOSIS — K59.00 CONSTIPATION, UNSPECIFIED CONSTIPATION TYPE: ICD-10-CM

## 2024-12-19 DIAGNOSIS — M62.08 DIASTASIS RECTI: ICD-10-CM

## 2024-12-19 DIAGNOSIS — R51.9 DAILY HEADACHE: ICD-10-CM

## 2024-12-19 DIAGNOSIS — Z00.00 ROUTINE GENERAL MEDICAL EXAMINATION AT A HEALTH CARE FACILITY: Primary | ICD-10-CM

## 2024-12-19 DIAGNOSIS — H04.123 DRY EYES: ICD-10-CM

## 2024-12-19 ASSESSMENT — PAIN SCALES - GENERAL: PAINLEVEL_OUTOF10: MILD PAIN (3)

## 2024-12-19 NOTE — NURSING NOTE
"39 year old    Chief Complaint   Patient presents with    Physical     Skin tags, moles  Headaches and itchy eyes  How long she can take a stool softener  Diastasis recti   General post partum healing          Blood pressure 112/78, pulse 75, temperature 98.3  F (36.8  C), temperature source Skin, resp. rate 16, height 1.69 m (5' 6.54\"), weight 58.5 kg (129 lb), SpO2 97%, currently breastfeeding. Body mass index is 20.49 kg/m .    Patient Active Problem List   Diagnosis    Basal cell carcinoma of scalp and skin of neck    GERD (gastroesophageal reflux disease)    Orthostatic dizziness    Palpitations    Raynaud's phenomenon    SVT (supraventricular tachycardia) (H)    IUD (intrauterine device) in place    Myopia    Talar fracture          Wt Readings from Last 2 Encounters:   12/19/24 58.5 kg (129 lb)   01/10/24 51.3 kg (113 lb)       BP Readings from Last 3 Encounters:   12/19/24 112/78   01/10/24 114/75   10/31/23 116/82             Current Outpatient Medications   Medication Sig Dispense Refill    levonorgestrel (KYLEENA) 19.5 MG IUD 1 each by Intrauterine route      metoprolol tartrate (LOPRESSOR) 50 MG tablet Take 0.5-1 tablets (25-50 mg) by mouth 2 times daily as needed (palpitations) 30 tablet 1     No current facility-administered medications for this visit.          Social History     Tobacco Use    Smoking status: Never    Smokeless tobacco: Never   Vaping Use    Vaping status: Never Used   Substance Use Topics    Alcohol use: Yes     Comment: 2 drinks week    Drug use: Never          Health Maintenance Due   Topic Date Due    ADVANCE CARE PLANNING  Never done    GLUCOSE  Never done    YEARLY PREVENTIVE VISIT  10/31/2024        No results found for: \"PAP\"        December 19, 2024 9:27 AM        "

## 2024-12-19 NOTE — PROGRESS NOTES
Preventive Care Visit  [facility]  Bernice Maguire MD, Family Medicine  Dec 19, 2024      Assessment & Plan     Lizeth was seen today for physical.    Diagnoses and all orders for this visit:    Routine general medical examination at a health care facility    Routine postpartum follow-up    Diastasis recti  -     Physical Therapy  Referral; Future    Constipation, unspecified constipation type    Daily headache    Dry eyes      See AVS  If no improvement in headaches, could consider short trial of nortriptyline to help with weaning off of NSAIDs.          Counseling  Appropriate preventive services were addressed with this patient via screening, questionnaire, or discussion as appropriate for fall prevention, nutrition, physical activity, Tobacco-use cessation, social engagement, weight loss and cognition.  Checklist reviewing preventive services available has been given to the patient.  Reviewed patient's diet, addressing concerns and/or questions.       Return in about 53 weeks (around 12/25/2025) for Annual Wellness Visit.    Subjective   Lizeth is a 39 year old, presenting for the following:  Physical (Skin tags, moles/Headaches and itchy eyes/How long she can take a stool softener/Diastasis recti /General post partum healing  )         HPI    No tears     Headaches - slowly improving  Trying to cut back on ibuprofen - has been taking daily  Ibuprofen helps/makes it better.  Behind the eyes, dull pain. Massaging the temples needs to help. Dim lights seem to help.  Currently down to 200 mg x 2. Every 6-8 hours.  Hasn't had any GI issues  Itchy eyes - has previously been diagnosed with blepharitis.  Eyes are significantly itching  No nasal congestion. No sinus pain/pressure. Using saline spray in the morning  Not currently using any eye drops    Stool softener - senna - currently taking once per day - has tapered down from twice per day  Has a magnesium powder - has been adding this on  Increasing  dates    Bleeding - stopped bleeding at 14 days postpartum  Feeding - going well. Still has a shallow latch - mildly discomfort  Initial engorgement has improved especially with icing  Hasn't introduced any pumping    No PPD/PPA concerns  Baby blues has passed     - has vasectomy scheduled        12/17/2024   General Health   How would you rate your overall physical health? Good   Feel stress (tense, anxious, or unable to sleep) Only a little   (!) STRESS CONCERN      12/17/2024   Nutrition   Three or more servings of calcium each day? Yes   Diet: Regular (no restrictions)   How many servings of fruit and vegetables per day? (!) 2-3   How many sweetened beverages each day? 0-1         12/17/2024   Exercise   Days per week of moderate/strenous exercise 4 days   Average minutes spent exercising at this level 20 min         12/17/2024   Social Factors   Frequency of gathering with friends or relatives Twice a week   Worry food won't last until get money to buy more No   Food not last or not have enough money for food? No   Do you have housing? (Housing is defined as stable permanent housing and does not include staying ouside in a car, in a tent, in an abandoned building, in an overnight shelter, or couch-surfing.) Yes   Are you worried about losing your housing? No   Lack of transportation? No   Unable to get utilities (heat,electricity)? No         12/17/2024   Dental   Dentist two times every year? Yes         12/17/2024   TB Screening   Were you born outside of the [place]? No         Today's PHQ-2 Score:       1/10/2024     4:27 PM   PHQ-2 ( 1999 [employer])   Q1: Little interest or pleasure in doing things 0   Q2: Feeling down, depressed or hopeless 0   PHQ-2 Score 0         12/17/2024   Substance Use   Alcohol more than 3/day or more than 7/wk No   Do you use any other substances recreationally? No     Social History     Tobacco Use    Smoking status: Never    Smokeless tobacco: Never   Vaping Use    Vaping  "status: Never Used   Substance Use Topics    Alcohol use: Yes     Comment: 2 drinks week    Drug use: Never          Mammogram Screening - Patient under 40 years of age: Routine Mammogram Screening not recommended.         12/17/2024   STI Screening   New sexual partner(s) since last STI/HIV test? No     History of abnormal Pap smear: No - age 30- 64 PAP with HPV every 5 years recommended        Latest Ref Rng & Units 12/22/2022     1:40 PM   PAP / HPV   PAP  Negative for Intraepithelial Lesion or Malignancy (NILM)    HPV 16 DNA Negative Negative    HPV 18 DNA Negative Negative    Other HR HPV Negative Negative            12/17/2024   Contraception/Family Planning   Questions about contraception or family planning No     Reviewed and updated as needed this visit by Provider   Tobacco  Allergies   Problems   Surg Hx  Fam Hx  Soc Hx Sexual   Activity               Objective    Exam  /78 (BP Location: Left arm, Patient Position: Sitting, Cuff Size: Adult Regular)   Pulse 75   Temp 98.3  F (36.8  C) (Skin)   Resp 16   Ht 1.69 m (5' 6.54\")   Wt 58.5 kg (129 lb)   SpO2 97%   Breastfeeding Yes   BMI 20.49 kg/m     Estimated body mass index is 20.49 kg/m  as calculated from the following:    Height as of this encounter: 1.69 m (5' 6.54\").    Weight as of this encounter: 58.5 kg (129 lb).    Physical Exam  General: Well-appearing in NAD   HEENT: Normocephalic, atraumatic. Mucus membranes moist.   Resp: No respiratory distress   MSK: No peripheral edema.   Skin: No rashes or lesions noted.   Psych: Appropriate affect. Mood is good       Signed Electronically by: Bernice Maguire MD    "

## 2024-12-19 NOTE — PATIENT INSTRUCTIONS
Good to see you today!    STOP senna and start Miralax twice per day instead  Can reduce to 1x/day if stools are soft    Work to taper off of ibuprofen  400 mg x 2 x 3-5 days  Then 200 mg 2-3x/day x 3-5 days  200 mg twice per day  Then stop.    Pelvic floor PT referral    Preservative free eye drops  Consider trial of Flonase if no improvement  Eye doctor follow-up if no improvement.    Patient Education   Preventive Care Advice   This is general advice given by our system to help you stay healthy. However, your care team may have specific advice just for you. Please talk to your care team about your preventive care needs.  Nutrition  Eat 5 or more servings of fruits and vegetables each day.  Try wheat bread, brown rice and whole grain pasta (instead of white bread, rice, and pasta).  Get enough calcium and vitamin D. Check the label on foods and aim for 100% of the RDA (recommended daily allowance).  Lifestyle  Exercise at least 150 minutes each week  (30 minutes a day, 5 days a week).  Do muscle strengthening activities 2 days a week. These help control your weight and prevent disease.  No smoking.  Wear sunscreen to prevent skin cancer.  Have a dental exam and cleaning every 6 months.  Yearly exams  See your health care team every year to talk about:  Any changes in your health.  Any medicines your care team has prescribed.  Preventive care, family planning, and ways to prevent chronic diseases.  Shots (vaccines)   HPV shots (up to age 26), if you've never had them before.  Hepatitis B shots (up to age 59), if you've never had them before.  COVID-19 shot: Get this shot when it's due.  Flu shot: Get a flu shot every year.  Tetanus shot: Get a tetanus shot every 10 years.  Pneumococcal, hepatitis A, and RSV shots: Ask your care team if you need these based on your risk.  Shingles shot (for age 50 and up)  General health tests  Diabetes screening:  Starting at age 35, Get screened for diabetes at least every 3  years.  If you are younger than age 35, ask your care team if you should be screened for diabetes.  Cholesterol test: At age 39, start having a cholesterol test every 5 years, or more often if advised.  Bone density scan (DEXA): At age 50, ask your care team if you should have this scan for osteoporosis (brittle bones).  Hepatitis C: Get tested at least once in your life.  STIs (sexually transmitted infections)  Before age 24: Ask your care team if you should be screened for STIs.  After age 24: Get screened for STIs if you're at risk. You are at risk for STIs (including HIV) if:  You are sexually active with more than one person.  You don't use condoms every time.  You or a partner was diagnosed with a sexually transmitted infection.  If you are at risk for HIV, ask about PrEP medicine to prevent HIV.  Get tested for HIV at least once in your life, whether you are at risk for HIV or not.  Cancer screening tests  Cervical cancer screening: If you have a cervix, begin getting regular cervical cancer screening tests starting at age 21.  Breast cancer scan (mammogram): If you've ever had breasts, begin having regular mammograms starting at age 40. This is a scan to check for breast cancer.  Colon cancer screening: It is important to start screening for colon cancer at age 45.  Have a colonoscopy test every 10 years (or more often if you're at risk) Or, ask your provider about stool tests like a FIT test every year or Cologuard test every 3 years.  To learn more about your testing options, visit:   .  For help making a decision, visit:   https://bit.ly/gz24787.  Prostate cancer screening test: If you have a prostate, ask your care team if a prostate cancer screening test (PSA) at age 55 is right for you.  Lung cancer screening: If you are a current or former smoker ages 50 to 80, ask your care team if ongoing lung cancer screenings are right for you.  For informational purposes only. Not to replace the advice of your  health care provider. Copyright   2023 [facility]. All rights reserved. Clinically reviewed by the [facility] Transitions Program. [place] [id number] - REV 01/24.

## 2025-01-09 ENCOUNTER — LAB (OUTPATIENT)
Dept: LAB | Facility: CLINIC | Age: 40
End: 2025-01-09
Payer: COMMERCIAL

## 2025-01-09 DIAGNOSIS — J02.9 SORE THROAT: Primary | ICD-10-CM

## 2025-01-09 DIAGNOSIS — J02.9 SORE THROAT: ICD-10-CM

## 2025-01-09 LAB
FLUAV RNA SPEC QL NAA+PROBE: NEGATIVE
FLUBV RNA RESP QL NAA+PROBE: NEGATIVE
RSV RNA SPEC NAA+PROBE: NEGATIVE
SARS-COV-2 RNA RESP QL NAA+PROBE: NEGATIVE

## 2025-01-10 PROBLEM — M62.08 DIASTASIS RECTI: Status: ACTIVE | Noted: 2025-01-10

## 2025-01-29 ENCOUNTER — THERAPY VISIT (OUTPATIENT)
Dept: PHYSICAL THERAPY | Facility: CLINIC | Age: 40
End: 2025-01-29
Attending: FAMILY MEDICINE
Payer: COMMERCIAL

## 2025-01-29 DIAGNOSIS — M62.08 DIASTASIS RECTI: ICD-10-CM

## 2025-01-29 PROCEDURE — 97110 THERAPEUTIC EXERCISES: CPT | Mod: GP | Performed by: PHYSICAL THERAPIST

## 2025-01-29 PROCEDURE — 97530 THERAPEUTIC ACTIVITIES: CPT | Mod: GP | Performed by: PHYSICAL THERAPIST

## 2025-02-20 ENCOUNTER — THERAPY VISIT (OUTPATIENT)
Dept: PHYSICAL THERAPY | Facility: CLINIC | Age: 40
End: 2025-02-20
Payer: COMMERCIAL

## 2025-02-20 DIAGNOSIS — M62.08 DIASTASIS RECTI: Primary | ICD-10-CM

## 2025-02-20 NOTE — PROGRESS NOTES
DISCHARGE  Reason for Discharge: patient plans to return to work and continue on her own    Equipment Issued:     Discharge Plan: Patient to continue home program.   02/20/25 0500   Appointment Info   Signing clinician's name / credentials Chelsea Natarajan,ATC, SCS   Total/Authorized Visits E+T(4-6)   Visits Used 3   Medical Diagnosis Diastasis recti   PT Tx Diagnosis Diastasis recti   Precautions/Limitations baby at appointment today   Other pertinent information off for 14 weeks, has two other girls, research director   Quick Adds Pelvic Consent   Progress Note/Certification   Onset of illness/injury or Date of Surgery 11/23/24   Therapy Frequency 2 times a month   Predicted Duration 3 months   Progress Note Due Date 04/09/25   Progress Note Completed Date 01/10/25   GOALS   PT Goals 2;3   PT Goal 1   Goal Identifier sitting up in bed   Goal Description able to sit up in bed with no c/o abdominal strain or doming noted   Rationale to maximize safety and independence with performance of ADLs and functional tasks   Goal Progress 2 finger widths with head lift   Target Date 04/09/25   PT Goal 2   Goal Identifier urinary incontinence   Goal Description able to jump with no c/o urinary leaking   Rationale to maximize safety and independence with performance of ADLs and functional tasks;to maximize safety and independence within the community;to maximize safety and independence with self cares   Goal Progress currently leaking with cough, sneeze and jumping   Target Date 04/09/25   Date Met 02/20/25   PT Goal 3   Goal Identifier bowel movements   Goal Description no medication needed for bowel movements   Rationale to maximize safety and independence with performance of ADLs and functional tasks   Goal Progress not needing any medication   Target Date 02/09/25   Date Met 01/29/25   Subjective Report   Subjective Report Has been skating and strength training. The pain has been a lot less.  No sharp pain.  Not needing  Continue rosuvastatin and fenofibrate.   to take Miralax  Has tried jumping rope and jumping jacks and had leaking within 30 seconds.  Had this after the second pregnancy and this never completely resolved.   Objective Measures   Objective Measures Objective Measure 1;Objective Measure 2;Objective Measure 3   Objective Measure 1   Objective Measure diastasis   Details currently has diastasis of 4+ finger width at rest above umbilicus, and down to 3 with head lift   Objective Measure 2   Objective Measure pelvic muscle strength   Details 4+/5 with 10 sec hold assessed internally   Objective Measure 3   Objective Measure bearing down   Details some bladder descent noted with cough   Treatment Interventions (PT)   Interventions Therapeutic Procedure/Exercise;Therapeutic Activity;Neuromuscular Re-education   Therapeutic Procedure/Exercise   Therapeutic Procedures: strength, endurance, ROM, flexibility minutes (13033) 15   PTRx Ther Proc 1 Ileocecal Valve Stimulation    PTRx Ther Proc 1 - Details can try for the right sided abdominal pain or with stimulation of bowel movements   PTRx Ther Proc 2 Side-lying Positional Traction   PTRx Ther Proc 2 - Details 30 sec   PTRx Ther Proc 3 Pelvic Floor Muscle Strengthening Basic   PTRx Ther Proc 3 - Details in supine 5 reps for 5 sec   PTRx Ther Proc 4 Side Plank   PTRx Ther Proc 4 - Details HEP   PTRx Ther Proc 5 Prone Plank Modified Knees   PTRx Ther Proc 5 - Details HEP   PTRx Ther Proc 6 Abdominal Strengthening For Diastasis Recti   PTRx Ther Proc 6 - Details trial in the clinic with sheet at abdominal region   PTRx Ther Proc 7 Sidelying Abdominal Zipper   PTRx Ther Proc 7 - Details practice this with your other abdominal exercises or before lifting to avoid strain on the area   PTRx Ther Proc 8 Supine Abdominal Exercise #3 (Marching)   PTRx Ther Proc 8 - Details 5 reps   PTRx Ther Proc 9 Supine Abdominal Exercise #3B (Two Leg Marching)   PTRx Ther Proc 9 - Details doing this at home, 5 reps in clinic   PTRx Ther  Proc 10 Supine Abdominal Exercise #8 (Toe Taps)   PTRx Ther Proc 10 - Details #5 x 10   PTRx Ther Proc 11 Supine Abdominal Exercise #9A (Arm/Leg Extension With Feet Off the Floor)   PTRx Ther Proc 11 - Details discussed trying to progress to this   PTRx Ther Proc 12 Shoulder Theraband Rows   PTRx Ther Proc 12 - Details HEP   PTRx Ther Proc 13 Diastasis Recti Abdominis Oblique Exercises with Band   PTRx Ther Proc 13 - Details verbal review   Skilled Intervention verbal and tactile cues   Patient Response/Progress understanding expressed, questions answered   PTRx Ther Proc 14 Push-Up Plus At Counter   PTRx Ther Proc 14 - Details 10 reps   Therapeutic Activity   Therapeutic Activities: dynamic activities to improve functional performance minutes (84564) 15   PTRx Ther Act 2 Return to Run Protocol   PTRx Ther Act 2 - Details disused gradual return to help with strength   PTRx Ther Act 3 Education Sheet General   PTRx Ther Act 3 - Details empty bladder before running Focus on breathing with exhale as you pull the rowing erg and watch for doming as your trunk leans backward Could try Impressa an OTC vaginal insert for incontinence Do your simple pelvic muscle strengthening exercises daily Abdominal strengthening 3 times a week- lying down standing Abdominal binder   PTRx Ther Act 8 Diastasis Recti Evaluation   PTRx Ther Act 8 - Details approx 3 finger widths at umbilicus   PTRx Ther Act 9 Pelvic Floor Anatomy and Function   PTRx Ther Act 9 - Details for instruction in function   Skilled Intervention education to help with abdominal strength and incontinence and bowel dysfunction   Patient Response/Progress understanding expressed, questions answered   Neuromuscular Re-education   Neuromuscular re-ed of mvmt, balance, coord, kinesthetic sense, posture, proprioception minutes (95704) 5   PTRx Neuro Re-ed 1 Jumping Pelvic Floor   PTRx Neuro Re-ed 1 - Details discussed continuing to try at home   PTRx Neuro Re-ed 2 Bridging  Pelvic Floor    PTRx Neuro Re-ed 2 - Details 3 reps   PTRx Neuro Re-ed 3 Pallof Press   PTRx Neuro Re-ed 3 - Details HEP   Skilled Intervention instruction in strengthening   Patient Response/Progress mild pain with Pallof   PTRx Neuro Re-ed 4 Squat Pelvic Floor   PTRx Neuro Re-ed 4 - Details discussed trying with a ball   Self Care/home Management   PTRx Self Care 1 - Details can try for stimulation of bowel movements   Education   Learner/Method Patient;Listening;Reading;Demonstration;Pictures/Video;No Barriers to Learning   Plan   Home program see ptrx on phone   Updates to plan of care advanced strengthening and diastasis   Plan for next session return if needed   Comments   Comments has 3 girls, from [facility] Informed Consent Statement Discussed with patient/guardian reason for referral regarding pelvic health needs and external/internal pelvic floor muscle examination.  Opportunity provided to ask questions and verbal consent for assessment and intervention was given.   Total Session Time   Timed Code Treatment Minutes 35   Total Treatment Time (sum of timed and untimed services) 35       Referring Provider:  Bernice Maguire

## 2025-03-03 ENCOUNTER — OFFICE VISIT (OUTPATIENT)
Dept: FAMILY MEDICINE | Facility: CLINIC | Age: 40
End: 2025-03-03
Payer: COMMERCIAL

## 2025-03-03 VITALS
SYSTOLIC BLOOD PRESSURE: 115 MMHG | OXYGEN SATURATION: 98 % | DIASTOLIC BLOOD PRESSURE: 75 MMHG | TEMPERATURE: 98 F | HEART RATE: 72 BPM

## 2025-03-03 DIAGNOSIS — N61.0 MASTITIS: Primary | ICD-10-CM

## 2025-03-03 RX ORDER — CLINDAMYCIN HYDROCHLORIDE 300 MG/1
300 CAPSULE ORAL 4 TIMES DAILY
Qty: 28 CAPSULE | Refills: 0 | Status: SHIPPED | OUTPATIENT
Start: 2025-03-03

## 2025-03-03 NOTE — PROGRESS NOTES
"  Assessment & Plan   Problem List Items Addressed This Visit    None  Visit Diagnoses       Mastitis    -  Primary    Relevant Medications    clindamycin (CLEOCIN) 300 MG capsule           Chief concern for bacterial infection. ABX as noted above. Cautioned Lizeth to monitor for worsening GI symptoms in both her and her baby. Encouraged heat to prevent breast milk blockage, ice to help reduce inflammation.      The longitudinal plan of care for the diagnosis(es)/condition(s) as documented were addressed during this visit. Due to the added complexity in care, I will continue to support Lizeth in the subsequent management and with ongoing continuity of care.    25 minutes spent on the date of the encounter doing chart review, history and exam, documentation and further activities as noted.    Sang Jimenez MD  10:46 AM, March 3, 2025        Subjective   Lizeth is a 39 year old, presenting for the following health issues:  Breast Pain (Left side, noticed the last 2-3 days.)    HPI    \"Mastitis\"  - LEFT breast  - for the past 2-3 days  - painful to nurse  - has tried ice and heat, but symptoms persist        Review of Systems  Constitutional, HEENT, cardiovascular, pulmonary, gi and gu systems are negative, except as otherwise noted.      Objective    Pulse 72   Temp 98  F (36.7  C)   SpO2 98%   There is no height or weight on file to calculate BMI.  Physical Exam   GENERAL: alert and no distress  NECK: no adenopathy, no asymmetry, masses, or scars  RESP: lungs clear to auscultation - no rales, rhonchi or wheezes  CV: regular rate and rhythm, normal S1 S2, no S3 or S4, no murmur, click or rub, no peripheral edema  BREAST: LEFT breast appears tender, erythematous and mildly swollen, no bleeding or drainage  ABDOMEN: soft, nontender, no hepatosplenomegaly, no masses and bowel sounds normal  MS: no gross musculoskeletal defects noted, no edema          Signed Electronically by: Sang Jimenez MD    "

## 2025-03-07 ENCOUNTER — MYC MEDICAL ADVICE (OUTPATIENT)
Dept: FAMILY MEDICINE | Facility: CLINIC | Age: 40
End: 2025-03-07

## 2025-03-07 DIAGNOSIS — O92.70 LACTATION PROBLEM: Primary | ICD-10-CM

## 2025-03-11 RX ORDER — TRIAMCINOLONE ACETONIDE 1 MG/G
OINTMENT TOPICAL 2 TIMES DAILY
Qty: 30 G | Refills: 0 | Status: SHIPPED | OUTPATIENT
Start: 2025-03-11

## 2025-03-11 NOTE — TELEPHONE ENCOUNTER
Called to discuss plan with patient:  1) Icing, no heat  2) No further antibiotics indicated at this time  3) Topical steroid to nipple bleb  4) Consider gentle unroofing of bleb    Follow-up with me if pain not improving with these interventions.    Bernice Maguire MD

## 2025-04-17 ENCOUNTER — OFFICE VISIT (OUTPATIENT)
Dept: FAMILY MEDICINE | Facility: CLINIC | Age: 40
End: 2025-04-17
Payer: COMMERCIAL

## 2025-04-17 VITALS
TEMPERATURE: 98.2 F | RESPIRATION RATE: 16 BRPM | DIASTOLIC BLOOD PRESSURE: 82 MMHG | HEART RATE: 84 BPM | OXYGEN SATURATION: 97 % | BODY MASS INDEX: 19.46 KG/M2 | WEIGHT: 122.5 LBS | SYSTOLIC BLOOD PRESSURE: 123 MMHG

## 2025-04-17 DIAGNOSIS — O92.70 LACTATION PROBLEM: Primary | ICD-10-CM

## 2025-04-17 NOTE — NURSING NOTE
"39 year old  Chief Complaint   Patient presents with    Breast Pain     Nipple bleb    Musculoskeletal Problem     R ankle -- thinks sprained x5 days ago       Blood pressure 123/82, pulse 84, temperature 98.2  F (36.8  C), temperature source Temporal, resp. rate 16, weight 55.6 kg (122 lb 8 oz), SpO2 97%, currently breastfeeding. Body mass index is 19.46 kg/m .  Patient Active Problem List   Diagnosis    Basal cell carcinoma of scalp and skin of neck    GERD (gastroesophageal reflux disease)    Orthostatic dizziness    Palpitations    Raynaud's phenomenon    SVT (supraventricular tachycardia)    IUD (intrauterine device) in place    Myopia    Talar fracture    Diastasis recti       Wt Readings from Last 2 Encounters:   04/17/25 55.6 kg (122 lb 8 oz)   12/19/24 58.5 kg (129 lb)     BP Readings from Last 3 Encounters:   04/17/25 123/82   03/03/25 115/75   12/19/24 112/78         Current Outpatient Medications   Medication Sig Dispense Refill    triamcinolone (KENALOG) 0.1 % external ointment Apply topically 2 times daily. 30 g 0    clindamycin (CLEOCIN) 300 MG capsule Take 1 capsule (300 mg) by mouth 4 times daily. 28 capsule 0     No current facility-administered medications for this visit.       Social History     Tobacco Use    Smoking status: Never    Smokeless tobacco: Never   Vaping Use    Vaping status: Never Used   Substance Use Topics    Alcohol use: Yes     Comment: 2 drinks week    Drug use: Never       Health Maintenance Due   Topic Date Due    ADVANCE CARE PLANNING  Never done       No results found for: \"PAP\"      April 17, 2025 10:57 AM    "

## 2025-04-17 NOTE — PROGRESS NOTES
Assessment & Plan     Lizeth was seen today for breast pain and musculoskeletal problem.    Diagnoses and all orders for this visit:    Lactation problem  -     sertraline (ZOLOFT) 50 MG tablet; Take 0.5 tablets (25 mg) by mouth daily for 28 days, THEN 1 tablet (50 mg) daily for 28 days.      Recurrent nipple bleb with associated pain.  Increase sunflower lecithin dosing.  Start sertraline for likely neuropathic pain component contributing  Consider therapeutic ultrasound if further flares.    [facility] sooner with concerns.    The longitudinal plan of care for the diagnosis(es)/condition(s) as documented were addressed during this visit. Due to the added complexity in care, I will continue to support Lizeth in the subsequent management and with ongoing continuity of care.     Subjective   Lizeth is a 39 year old, presenting for the following health issues:  Breast Pain (Nipple bleb) and Musculoskeletal Problem (R ankle -- thinks sprained x5 days ago)    HPI      Spot on nipple    Sometimes it is fine, then it will come back with a lot of pain  Tingling, stinging nipple pain. Not related to nursing/pumping. At the end of the day tends to be worse.  Second type of pain - deep, clogged breast pain  Iced, heat before pump, pumping, ibuprofen  24 hours later it resolved after larger volume plug    Taking sunflower lecithin  Forgets on occasion    Saturday - stepped off a curb  Started to swell right away  [place] a pop, now just really sore  Swelling has improved  Has sprained both ankles in the past        Objective    /82 (BP Location: Left arm, Patient Position: Sitting, Cuff Size: Adult Regular)   Pulse 84   Temp 98.2  F (36.8  C) (Temporal)   Resp 16   Wt 55.6 kg (122 lb 8 oz)   SpO2 97%   BMI 19.46 kg/m    Body mass index is 19.46 kg/m .  Physical Exam   General: Well-appearing in NAD   HEENT: Normocephalic, atraumatic. Mucus membranes moist.   Resp: No respiratory  distress   MSK: No peripheral edema.   Skin: No rashes or lesions noted.   Psych: Appropriate affect. Mood is good  Breasts: L breast with 1-2 mm white lesion on lower, inner quadrant of nipple. Size is not significantly affected by nursing. Infant latch is slightly shallow but no maternal pain reported and no distortion of nipple after feeding. No erythema of breast.          Signed Electronically by: Bernice Maguire MD

## 2025-04-17 NOTE — PATIENT INSTRUCTIONS
Good to see you today!    Sunflower lecithin - dose 5,000 -10,000 mg per day for nipple blebs    Consider trialing Beaugen cushions or similar for pumping comfort    Therapuetic ultrasound - if recurrent bleb reach out to consider treatment.  Dr. Anaya Chakraborty - Fourth Trimester Doc  [email or web address]      Start taking sertraline 25 mg (1/2 tablet per day)  Consider taking in the AM if you not any insomnia  GI side effects most prominent in first 1-2 weeks.  If recurrent episode of bleb with less pain -> continue on 25 mg  If recurrent episode with same pain -> increase to 50 mg if you have been taking for > 2 weeks already.

## 2025-04-21 ENCOUNTER — MYC MEDICAL ADVICE (OUTPATIENT)
Dept: FAMILY MEDICINE | Facility: CLINIC | Age: 40
End: 2025-04-21

## 2025-07-15 ENCOUNTER — OFFICE VISIT (OUTPATIENT)
Dept: FAMILY MEDICINE | Facility: CLINIC | Age: 40
End: 2025-07-15
Payer: COMMERCIAL

## 2025-07-15 VITALS
HEIGHT: 67 IN | OXYGEN SATURATION: 97 % | SYSTOLIC BLOOD PRESSURE: 112 MMHG | BODY MASS INDEX: 18.22 KG/M2 | DIASTOLIC BLOOD PRESSURE: 78 MMHG | WEIGHT: 116.08 LBS | HEART RATE: 84 BPM | TEMPERATURE: 97 F

## 2025-07-15 DIAGNOSIS — J02.9 SORE THROAT: Primary | ICD-10-CM

## 2025-07-15 LAB
DEPRECATED S PYO AG THROAT QL EIA: NEGATIVE
S PYO DNA THROAT QL NAA+PROBE: NOT DETECTED

## 2025-07-15 PROCEDURE — 87651 STREP A DNA AMP PROBE: CPT | Mod: ORL | Performed by: FAMILY MEDICINE

## 2025-07-15 RX ORDER — AZITHROMYCIN 250 MG/1
TABLET, FILM COATED ORAL
Qty: 6 TABLET | Refills: 0 | Status: SHIPPED | OUTPATIENT
Start: 2025-07-15 | End: 2025-07-20

## 2025-07-15 NOTE — PROGRESS NOTES
"  Assessment & Plan   Problem List Items Addressed This Visit    None  Visit Diagnoses         Sore throat    -  Primary    Relevant Medications    azithromycin (ZITHROMAX) 250 MG tablet    Other Relevant Orders    Streptococcus A Rapid Screen w/Reflex to PCR (Completed)    Group A Streptococcus PCR Throat Swab           Initial strep test negative. PCR pending. Given history and presentation today, I am starting Lizeth on a Z-pack. Will notify her if follow up testing is also negative and she can stop treatment.     Sang Jimenez MD  10:07 AM, July 15, 2025      Subjective   Lizeth is a 39 year old, presenting for the following health issues:  Nasal Congestion (Ongoing for 1 week. Getting worse and worse. Throat pain.)    HPI    \"Congestion, sore throat for a week, asking for a strep test\"  - traveled to [place] las week and started to feel head congestion and fatigue  - woke up this morning with a terrible sore throat  - no cough  - chills, but no measured fever  - history of frequent strep infections      Review of Systems  Constitutional, HEENT, cardiovascular, pulmonary, gi and gu systems are negative, except as otherwise noted.      Objective    /78   Pulse 84   Temp 97  F (36.1  C)   Ht 1.69 m (5' 6.54\")   Wt 52.7 kg (116 lb 1.3 oz)   SpO2 97%   BMI 18.44 kg/m    Body mass index is 18.44 kg/m .    Physical Exam   GENERAL: alert and no distress  NECK: adenopathy, no asymmetry, masses, or scars  RESP: lungs clear to auscultation - no rales, rhonchi or wheezes  CV: regular rate and rhythm, normal S1 S2, no S3 or S4, no murmur, click or rub, no peripheral edema  MS: no gross musculoskeletal defects noted, no edema          Signed Electronically by: Sang Jimenez MD    "